# Patient Record
Sex: FEMALE | Race: WHITE | NOT HISPANIC OR LATINO | Employment: PART TIME | URBAN - METROPOLITAN AREA
[De-identification: names, ages, dates, MRNs, and addresses within clinical notes are randomized per-mention and may not be internally consistent; named-entity substitution may affect disease eponyms.]

---

## 2019-03-04 ENCOUNTER — OFFICE VISIT - HEALTHEAST (OUTPATIENT)
Dept: MIDWIFE SERVICES | Facility: CLINIC | Age: 39
End: 2019-03-04

## 2019-03-04 DIAGNOSIS — Z53.8 UNSUCCESSFUL IUD INSERTION: ICD-10-CM

## 2019-03-04 DIAGNOSIS — Z72.51 UNPROTECTED SEX: ICD-10-CM

## 2019-03-04 DIAGNOSIS — Z30.433 ENCOUNTER FOR IUD REMOVAL AND REINSERTION: ICD-10-CM

## 2019-03-04 DIAGNOSIS — Z12.4 CERVICAL CANCER SCREENING: ICD-10-CM

## 2019-03-04 DIAGNOSIS — Z30.432 ENCOUNTER FOR IUD REMOVAL: ICD-10-CM

## 2019-03-04 RX ORDER — MISOPROSTOL 200 UG/1
200 TABLET ORAL 2 TIMES DAILY
Qty: 2 TABLET | Refills: 0 | Status: SHIPPED | OUTPATIENT
Start: 2019-03-04 | End: 2024-02-22

## 2019-03-04 ASSESSMENT — MIFFLIN-ST. JEOR: SCORE: 1361.79

## 2019-03-05 LAB
C TRACH DNA SPEC QL PROBE+SIG AMP: NEGATIVE
HPV SOURCE: NORMAL
HUMAN PAPILLOMA VIRUS 16 DNA: NEGATIVE
HUMAN PAPILLOMA VIRUS 18 DNA: NEGATIVE
HUMAN PAPILLOMA VIRUS FINAL DIAGNOSIS: NORMAL
HUMAN PAPILLOMA VIRUS OTHER HR: NEGATIVE
N GONORRHOEA DNA SPEC QL NAA+PROBE: NEGATIVE
SPECIMEN DESCRIPTION: NORMAL

## 2019-03-06 ENCOUNTER — COMMUNICATION - HEALTHEAST (OUTPATIENT)
Dept: OBGYN | Facility: CLINIC | Age: 39
End: 2019-03-06

## 2019-03-08 ENCOUNTER — OFFICE VISIT - HEALTHEAST (OUTPATIENT)
Dept: MIDWIFE SERVICES | Facility: CLINIC | Age: 39
End: 2019-03-08

## 2019-03-08 DIAGNOSIS — Z30.430 ENCOUNTER FOR INSERTION OF INTRAUTERINE CONTRACEPTIVE DEVICE: ICD-10-CM

## 2019-03-08 DIAGNOSIS — Z01.812 PRE-PROCEDURE LAB EXAM: ICD-10-CM

## 2019-03-08 LAB — HCG UR QL: NEGATIVE

## 2019-03-08 ASSESSMENT — MIFFLIN-ST. JEOR: SCORE: 1361.79

## 2019-03-12 ENCOUNTER — COMMUNICATION - HEALTHEAST (OUTPATIENT)
Dept: OBGYN | Facility: HOSPITAL | Age: 39
End: 2019-03-12

## 2019-03-26 ENCOUNTER — COMMUNICATION - HEALTHEAST (OUTPATIENT)
Dept: SCHEDULING | Facility: CLINIC | Age: 39
End: 2019-03-26

## 2019-03-27 ENCOUNTER — COMMUNICATION - HEALTHEAST (OUTPATIENT)
Dept: ADMINISTRATIVE | Facility: CLINIC | Age: 39
End: 2019-03-27

## 2021-05-27 NOTE — TELEPHONE ENCOUNTER
"[facility] clinic midwife: SOPHY Herron  Requesting US results, stating she just missed the call from the midwife earlier today.  Conclusion: normal pelvic ultrasound with IUD in place. And also findings: \"IUD in good position\"  This information was given to patient who was very concerned when she missed the call from the midwife, she was worried something was wrong.   If she has additional questions or concerns, she will call back in am when clinic reopens.     Liliana Alves RN [facility] Triage Nurse  "

## 2021-05-27 NOTE — TELEPHONE ENCOUNTER
Pt would like to speak to MARAL specifically regarding some clarification of test results she received yesterday. Pt will not be able to take a call from 9:15-12:30 but otherwise should be available.

## 2021-05-27 NOTE — TELEPHONE ENCOUNTER
Triage call: No PCP within [facility]- will route to Nurse Midwife    US done today, check placement of IUD via US-  Midwife called her to review results but she missed the call. Called to nurse midwives after hours to have midwife call patient back to discuss and answer questions. Saleem Arriaga is on call for nurse midwives and will call patient back.       Chrystal Brink RN BSBA [facility] Triage/Med Refill 3/26/2019 8:02 PM

## 2021-06-02 ENCOUNTER — RECORDS - HEALTHEAST (OUTPATIENT)
Dept: ADMINISTRATIVE | Facility: CLINIC | Age: 41
End: 2021-06-02

## 2021-06-02 VITALS — WEIGHT: 160 LBS | BODY MASS INDEX: 27.31 KG/M2 | HEIGHT: 64 IN

## 2021-06-03 ENCOUNTER — RECORDS - HEALTHEAST (OUTPATIENT)
Dept: ADMINISTRATIVE | Facility: CLINIC | Age: 41
End: 2021-06-03

## 2021-06-24 NOTE — PROGRESS NOTES
Subjective:  Jenelle has been pleased with her Mirena IUD.  Notes no periods.  She did have an episode of spotting during a stressful time in her life.      Last intercourse was on Saturday      IUD Insertion Procedure Note    Pre-operative Diagnosis: Mirena IUD in place    Post-operative Diagnosis: Mirena IUD removed (placed 3/13/14) .  Reinsertion of Mirena IUD was unsuccessful    Indications: contraception  No contraindications to Mirena IUD:       No untreated pelvic infection now     Has not had a serious pelvic infection in the past 3 months after a pregnancy     Does not have CA of uterus or cervix    No unexplained uterine or vaginal bleeding    No liver disease or a liver tumor     No breast cancer or any other cancer that is sensitive to progestin     Has no condition that changes the shape of the uterus    Not allergic to levonorgestrel silicone, polyethylene, silica, barium sulfate or iron oxide     Procedure Details   Urine pregnancy test was not done.  GC/CT done. Pap was also maintained.  The risks (including infection, bleeding, pain, and uterine perforation) and benefits of the procedure were explained to the patient and Verbal and written informed consent was obtained.    Bimanual Exam performed, uterus is midline  Pap smear obtained.  Ectropion present, some bleeding when collecting the sample  IUD strings easily visualized at cervical os. Grasped with ring forceps and removed without difficulty. Minimal/no bleeding, patient tolerated well.   Cervix cleansed with Betadine. Tenaculum placed at 10 and 2 Uterus sounded to 5 cm.  Unable to insert the sound further than 5 cm as significant resistance was appreciated.  The sound was removed, straightened out and reinserted.  Tenaculum manipulated to straighten cervix.  The sound was advanced slowly and met with resistance again  Slow, steady pressure was applied, unable to advance the sound. Rather than risk perforation of the uterus, recommend   patient return for another attempt after taking oral Misoprostol.      Condition:  Stable    Complications:  Unable to insert new IUD    Plan:  -Rx for Misoprostol  mcg take 1 tab at  24 and 12 hours prior to IUD placement #2 no refills    -Rx Plan B One Step to avoid pregnancy due to intercourse on 3/2/19    -Advised no intercourse or protected intercourse 2 weeks prior to IUD placement    -GC/CT and pap obtained      BENEFITS reviewed: The IUD is 97-99% effective if all the directions regarding its use are followed carfeully. It can be more effective if used with foam or condoms mid-cycle between periods. IUDs containing progestin may decrease menstrual flow and painful menstrual periods. The IUD provides longer protection from pregnancy (Paragard- 10 years; Mirena - 5 years)  RISKS/ SIDE EFFECTS reviewed  1. Spotting, bleeding, hemorrhage or anemia  2. Cramping or pain  3. Partial or complete expulsion of device leading to pregnancy, the pregnancy ending in miscarriage  4. Lost IUD string or other string problems  5. Puncturing of the uterus, embedding, or cervical perforation  6. Increased risk of pelvic inflammatory disease  WARNING SIGNS: The patient was advised  to call the clinic if she has any of the following early warning signs:  P - Period late (pregnancy), abnormal spotting or bleeding  A - Abdominal pain, pain with intercourse  I - Infection exposure (such as gonorrhea), abnormal discharge  N - Not feeling well, fever, chills  S - String missing, shorter or longer  DECISION TO DISCONTINUE USE: She understands that she may have the IUD removed at any time. She knows not try to remove the device and that it should be removed only by a medical provider. If she does not desire to become pregnant, she has been told she may request to have another IUD inserted or choose to use another method of birth control. If she wishes to become pregnant, she understands most women not using birth control become  pregnant within 12 months.UD.    Nothing in the vagina x 24 hours.   Ibuprofen for cramping prn  Return to clinic for IUD placement      Medical/Surgical/Family history reviewed and updated in the chart  TT spent 40 minutes with >50% spent CCC.    SYDNI Santos, CNM

## 2021-06-24 NOTE — PATIENT INSTRUCTIONS - HE
Here is info on Plan B from the [facility] website:    https://www[facility].org/learn/morning-after-pill-emergency-contraception/whats-plan-b-morning-after-pill        I sent the cytotec order (misoprostol) order to your pharmacy.  Please take one pill by mouth 24 hours prior to the IUD placement and the second ill 12 hours prior to the IUD placement.

## 2021-06-24 NOTE — PROGRESS NOTES
IUD Insertion Procedure Note    Pre-operative Diagnosis: Contraceptive management    Post-operative Diagnosis: same    Indications: Contraception    HPI/ROS:   Jenelle Walker is a 39 y.o. female who presents for IUD insert.  Not menstruating with previous Mirena IUD in place which was removed on Monday, 3/4/2019.  Last sexual intercourse was on Saturday, 3/2/2019, and Plan B was prescribed on Monday, 3/4/2019 when replacement IUD was unable to be inserted. Current birth control method abstinence.     Urine pregnancy test was performed in clinic and was negative. Pt education included mechanism of action of Mirena IUD. The risks (including infection, bleeding, pain, and uterine perforation) and benefits of the procedure were explained to the patient and informed consent was obtained and consent form signed.     Procedure Details   Bimanual exam performed revealing an anteverted uterus, midline, non-tender, negative CMT. Cervix is parous, long, thick, closed. Sterile speculum placed. GC/CT collected on 3/4/2019 and both NEGATIVE. Cervix cleansed with Betadine. Tenaculum placed on cervix at 11 o'clock and 1 o'clock. Uterus sounded to 9 cm. IUD inserted without difficulty. String visible and trimmed to 3 cm. Tenaculum removed. Minimal bleeding noted. Patient tolerated procedure fairly well. Did not have any periods of syncope, sat up and ambulated with ease.     IUD Information:  Mirena, Expiration date June 2021.  Lot #[id number]    Condition:  Stable    Complications:  None    Plan:  -Discussed danger signs and symptoms of the IUD including how to check for strings.   -Instructed patient to check her strings monthly.   -Discussed when/where to call with any fever, severe back pain, severe abdominal pain, heavy bleeding (soaking more than 1 pad per hour).   -Also encouraged to call if she does not feel her strings.   -She was advised to use Ibuprofen as needed for mild to moderate pain.   -Manufactures information given  for patient education.   -Mirena IUD should be removed by 3/8/2024  -Pelvic ultrasound ordered to verify IUD placement prior to return for IUD string check  -Encouraged to return to clinic in 4-6 weeks for IUD check or sooner prn.     Total time with patient 40 mn >50% time spent in counseling or coordination of care.

## 2021-06-24 NOTE — TELEPHONE ENCOUNTER
Telephone Call with on Call CNM  I left a  for Jenelle and requested she call back to touch base, not urgent.    Wanted to discuss whether she took Plan B or not (IC 3/2) with Mirena IUD removed 3/4 and unsuccessful reinsertion of new device at that time.  Scheduled for IUD insert 3/8.    Also wanted to review instructions for PO cytotec prior to her appt on Friday.  I had sent her detailed instructions via ; however she does not curently have access.    SYDNI Santos, ZURDOM

## 2021-06-24 NOTE — TELEPHONE ENCOUNTER
I spoke with Jenelle, relayed the negative results of gc/ct and pending Pap/hpv.    She did take the Plan B without issue.  She is clear on how to take the Cytotec prior to IUD placement on Friday. She states the pharmacist was very thorough and helpful on how and when to take it.    She reports having several stressful events on Monday.  Hoping for successful placement of IUD on Friday.     SYDNI Santos, ZURDOM

## 2021-06-24 NOTE — TELEPHONE ENCOUNTER
TC: Called Jenelle to inform her of her normal pap result and negative HPV. She states she had some cramping over the weekend after her IUD insertion, but it has since improved.   Advised that she use a back up method for one week following her IUD insertion.     Answered all questions.     SYDNI Jonas,ZURDOM

## 2024-02-22 ENCOUNTER — OFFICE VISIT (OUTPATIENT)
Dept: OBGYN | Facility: CLINIC | Age: 44
End: 2024-02-22
Payer: COMMERCIAL

## 2024-02-22 VITALS
HEART RATE: 78 BPM | WEIGHT: 183 LBS | HEIGHT: 64 IN | BODY MASS INDEX: 31.24 KG/M2 | OXYGEN SATURATION: 94 % | DIASTOLIC BLOOD PRESSURE: 78 MMHG | SYSTOLIC BLOOD PRESSURE: 132 MMHG

## 2024-02-22 DIAGNOSIS — R10.2 PELVIC CRAMPING: Primary | ICD-10-CM

## 2024-02-22 PROCEDURE — 99203 OFFICE O/P NEW LOW 30 MIN: CPT | Performed by: OBSTETRICS & GYNECOLOGY

## 2024-02-22 ASSESSMENT — ANXIETY QUESTIONNAIRES
8. IF YOU CHECKED OFF ANY PROBLEMS, HOW DIFFICULT HAVE THESE MADE IT FOR YOU TO DO YOUR WORK, TAKE CARE OF THINGS AT HOME, OR GET ALONG WITH OTHER PEOPLE?: NOT DIFFICULT AT ALL
7. FEELING AFRAID AS IF SOMETHING AWFUL MIGHT HAPPEN: NOT AT ALL
7. FEELING AFRAID AS IF SOMETHING AWFUL MIGHT HAPPEN: NOT AT ALL
IF YOU CHECKED OFF ANY PROBLEMS ON THIS QUESTIONNAIRE, HOW DIFFICULT HAVE THESE PROBLEMS MADE IT FOR YOU TO DO YOUR WORK, TAKE CARE OF THINGS AT HOME, OR GET ALONG WITH OTHER PEOPLE: NOT DIFFICULT AT ALL
1. FEELING NERVOUS, ANXIOUS, OR ON EDGE: NOT AT ALL
6. BECOMING EASILY ANNOYED OR IRRITABLE: NOT AT ALL
GAD7 TOTAL SCORE: 0
4. TROUBLE RELAXING: NOT AT ALL
GAD7 TOTAL SCORE: 0
3. WORRYING TOO MUCH ABOUT DIFFERENT THINGS: NOT AT ALL
2. NOT BEING ABLE TO STOP OR CONTROL WORRYING: NOT AT ALL
5. BEING SO RESTLESS THAT IT IS HARD TO SIT STILL: NOT AT ALL

## 2024-08-19 NOTE — PROGRESS NOTES
CC: Jenelle Walker is here secondary to cramping.    HPI: The pt is a 44 year old MWF  who presents with pelvic cramping.  She has had a Mirena in place since 3/4/19.  She is not having periods.  She is also noting weight gain and a rash on her face/neck.    No past medical history on file.    Past Surgical History:   Procedure Laterality Date    MAMMOPLASTY REDUCTION      WISDOM TOOTH EXTRACTION         Patient's   Family History   Problem Relation Age of Onset    Cancer Mother     Diabetes Mother     Heart Disease Mother     Mental Illness Mother     Bipolar Disorder Mother     Dementia Maternal Grandmother     Depression Maternal Grandmother     Heart Disease Maternal Grandmother     Early Death Maternal Grandfather     Heart Disease Maternal Grandfather     Hypertension Maternal Grandfather     No Known Problems Daughter     Breast Cancer Maternal Half-Sister     Dementia Maternal Aunt     Heart Disease Maternal Aunt     Diabetes Maternal Uncle     Heart Disease Maternal Uncle        Patient   Social History     Socioeconomic History    Marital status:      Spouse name: None    Number of children: 2    Years of education: 14    Highest education level: None   Tobacco Use    Smoking status: Never     Passive exposure: Never    Smokeless tobacco: Never   Substance and Sexual Activity    Alcohol use: Yes    Drug use: Never    Sexual activity: Yes     Partners: Male     Birth control/protection: I.U.D.       Outpatient Medications Prior to Visit   Medication Sig Dispense Refill    levonorgestrel (MIRENA) 52 MG (20 mcg/day) IUD by Intrauterine route once      miSOPROStol (CYTOTEC) 200 MCG tablet [MISOPROSTOL (CYTOTEC) 200 MCG TABLET] Take 1 tablet (200 mcg total) by mouth 2 (two) times a day. 1 tab 24 hours prior to IUD placement and 1 tab 12 hours prior to IUD placement (Patient not taking: Reported on 2/22/2024) 2 tablet 0     No facility-administered medications prior to visit.       Patient is  "allergic to amoxicillin and azithromycin.    ROS:  12 part ROS is negative aside from those symptoms in the HPI    PE:  /78 (BP Location: Right arm, Patient Position: Sitting, Cuff Size: Adult Regular)   Pulse 78   Ht 1.619 m (5' 3.75\")   Wt 83 kg (183 lb)           Body mass index is 31.66 kg/m .    General: obese WF, NAD  Psych: normal mood  Neuro: CN I-XII grossly intact  MS: normal gait    Assessment: 44 year old MWF  with pelvic cramping.    Plan: Natural history of perimenopausal changes discussed with the patient.  We discussed that since the cramping doesn't stop her from doing things, we don't have to do anything.  I did recommend that she can used OTC NSAIDs if needed to help with the pain.  We discussed how hormonal changes can lead to the weight gain and possible the rash she has noted., although not present today.  At this time she will leave the Mirena in place.  We discussed that it is effective until 2027, but can be changed out sooner if she starts having more issues.    Questions were answered to the best of my ability.      Answers submitted by the patient for this visit:  GEMA-7 (Submitted on 2/22/2024)  GEMA 7 TOTAL SCORE: 0    "